# Patient Record
Sex: FEMALE | Race: ASIAN | NOT HISPANIC OR LATINO
[De-identification: names, ages, dates, MRNs, and addresses within clinical notes are randomized per-mention and may not be internally consistent; named-entity substitution may affect disease eponyms.]

---

## 2018-03-15 ENCOUNTER — APPOINTMENT (OUTPATIENT)
Dept: SURGERY | Facility: CLINIC | Age: 53
End: 2018-03-15
Payer: COMMERCIAL

## 2018-03-15 VITALS
HEIGHT: 63 IN | BODY MASS INDEX: 23.04 KG/M2 | HEART RATE: 76 BPM | DIASTOLIC BLOOD PRESSURE: 70 MMHG | SYSTOLIC BLOOD PRESSURE: 117 MMHG | WEIGHT: 130 LBS | TEMPERATURE: 98 F

## 2018-03-15 DIAGNOSIS — R22.32 LOCALIZED SWELLING, MASS AND LUMP, LEFT UPPER LIMB: ICD-10-CM

## 2018-03-15 DIAGNOSIS — Z82.3 FAMILY HISTORY OF STROKE: ICD-10-CM

## 2018-03-15 DIAGNOSIS — Z86.39 PERSONAL HISTORY OF OTHER ENDOCRINE, NUTRITIONAL AND METABOLIC DISEASE: ICD-10-CM

## 2018-03-15 DIAGNOSIS — Z83.3 FAMILY HISTORY OF DIABETES MELLITUS: ICD-10-CM

## 2018-03-15 LAB
BASOPHILS # BLD AUTO: 0.05 K/UL
BASOPHILS NFR BLD AUTO: 0.9 %
EOSINOPHIL # BLD AUTO: 0.2 K/UL
EOSINOPHIL NFR BLD AUTO: 3.7 %
HCT VFR BLD CALC: 40.9 %
HGB BLD-MCNC: 13.9 G/DL
IMM GRANULOCYTES NFR BLD AUTO: 0.4 %
LYMPHOCYTES # BLD AUTO: 1.63 K/UL
LYMPHOCYTES NFR BLD AUTO: 30.1 %
MAN DIFF?: NORMAL
MCHC RBC-ENTMCNC: 31.8 PG
MCHC RBC-ENTMCNC: 34 GM/DL
MCV RBC AUTO: 93.6 FL
MONOCYTES # BLD AUTO: 0.21 K/UL
MONOCYTES NFR BLD AUTO: 3.9 %
NEUTROPHILS # BLD AUTO: 3.31 K/UL
NEUTROPHILS NFR BLD AUTO: 61 %
PLATELET # BLD AUTO: 237 K/UL
RBC # BLD: 4.37 M/UL
RBC # FLD: 13.3 %
WBC # FLD AUTO: 5.42 K/UL

## 2018-03-15 PROCEDURE — 99202 OFFICE O/P NEW SF 15 MIN: CPT

## 2018-03-15 RX ORDER — AMOXICILLIN 500 MG/1
500 TABLET, FILM COATED ORAL
Qty: 56 | Refills: 0 | Status: ACTIVE | COMMUNITY
Start: 2017-12-07

## 2018-03-15 RX ORDER — CLARITHROMYCIN 500 MG/1
500 TABLET, FILM COATED ORAL
Qty: 28 | Refills: 0 | Status: ACTIVE | COMMUNITY
Start: 2017-12-07

## 2018-03-15 RX ORDER — OMEPRAZOLE 20 MG/1
20 CAPSULE, DELAYED RELEASE ORAL
Qty: 28 | Refills: 0 | Status: ACTIVE | COMMUNITY
Start: 2017-12-07

## 2018-03-16 LAB
ALBUMIN SERPL ELPH-MCNC: 4.2 G/DL
ALP BLD-CCNC: 60 U/L
ALT SERPL-CCNC: 11 U/L
ANION GAP SERPL CALC-SCNC: 12 MMOL/L
AST SERPL-CCNC: 15 U/L
BILIRUB SERPL-MCNC: 0.4 MG/DL
BUN SERPL-MCNC: 13 MG/DL
CALCIUM SERPL-MCNC: 9.7 MG/DL
CHLORIDE SERPL-SCNC: 100 MMOL/L
CO2 SERPL-SCNC: 28 MMOL/L
CREAT SERPL-MCNC: 0.83 MG/DL
GLUCOSE SERPL-MCNC: 139 MG/DL
HCG SERPL-MCNC: 3 MIU/ML
POTASSIUM SERPL-SCNC: 4 MMOL/L
PROT SERPL-MCNC: 7.3 G/DL
SODIUM SERPL-SCNC: 140 MMOL/L

## 2018-03-22 RX ORDER — SODIUM CHLORIDE 9 MG/ML
3 INJECTION INTRAMUSCULAR; INTRAVENOUS; SUBCUTANEOUS EVERY 8 HOURS
Qty: 0 | Refills: 0 | Status: DISCONTINUED | OUTPATIENT
Start: 2018-03-23 | End: 2018-03-31

## 2018-03-23 ENCOUNTER — OUTPATIENT (OUTPATIENT)
Dept: OUTPATIENT SERVICES | Facility: HOSPITAL | Age: 53
LOS: 1 days | End: 2018-03-23
Payer: COMMERCIAL

## 2018-03-23 ENCOUNTER — RESULT REVIEW (OUTPATIENT)
Age: 53
End: 2018-03-23

## 2018-03-23 ENCOUNTER — TRANSCRIPTION ENCOUNTER (OUTPATIENT)
Age: 53
End: 2018-03-23

## 2018-03-23 VITALS
WEIGHT: 130.95 LBS | SYSTOLIC BLOOD PRESSURE: 152 MMHG | TEMPERATURE: 98 F | HEART RATE: 81 BPM | DIASTOLIC BLOOD PRESSURE: 86 MMHG | RESPIRATION RATE: 18 BRPM | OXYGEN SATURATION: 99 % | HEIGHT: 62 IN

## 2018-03-23 VITALS
OXYGEN SATURATION: 100 % | TEMPERATURE: 98 F | HEART RATE: 65 BPM | SYSTOLIC BLOOD PRESSURE: 152 MMHG | DIASTOLIC BLOOD PRESSURE: 73 MMHG | RESPIRATION RATE: 14 BRPM

## 2018-03-23 DIAGNOSIS — R22.32 LOCALIZED SWELLING, MASS AND LUMP, LEFT UPPER LIMB: ICD-10-CM

## 2018-03-23 LAB — HCG UR QL: NEGATIVE — SIGNIFICANT CHANGE UP

## 2018-03-23 PROCEDURE — 88304 TISSUE EXAM BY PATHOLOGIST: CPT

## 2018-03-23 PROCEDURE — 24076 EX ARM/ELBOW TUM DEEP < 5 CM: CPT | Mod: LT

## 2018-03-23 PROCEDURE — 81025 URINE PREGNANCY TEST: CPT

## 2018-03-23 PROCEDURE — 88304 TISSUE EXAM BY PATHOLOGIST: CPT | Mod: 26

## 2018-03-23 PROCEDURE — 93005 ELECTROCARDIOGRAM TRACING: CPT

## 2018-03-23 RX ORDER — IBUPROFEN 200 MG
600 TABLET ORAL ONCE
Qty: 0 | Refills: 0 | Status: DISCONTINUED | OUTPATIENT
Start: 2018-03-23 | End: 2018-03-31

## 2018-03-23 RX ORDER — IBUPROFEN 200 MG
1 TABLET ORAL
Qty: 12 | Refills: 0 | OUTPATIENT
Start: 2018-03-23 | End: 2018-03-25

## 2018-03-23 RX ORDER — ONDANSETRON 8 MG/1
4 TABLET, FILM COATED ORAL ONCE
Qty: 0 | Refills: 0 | Status: DISCONTINUED | OUTPATIENT
Start: 2018-03-23 | End: 2018-03-23

## 2018-03-23 NOTE — ASU DISCHARGE PLAN (ADULT/PEDIATRIC). - MEDICATION SUMMARY - MEDICATIONS TO TAKE
I will START or STAY ON the medications listed below when I get home from the hospital:    ibuprofen 600 mg oral tablet  -- 1 tab(s) by mouth every 6 hours, As Needed -pain   -- Indication: For Mass of upper extremity, left

## 2018-03-23 NOTE — H&P PST ADULT - ASSESSMENT
52 F w/ a LUE subcutaneous mass, for elective excision today. Likely lipoma vs cystic mass vs pilomatrixoma

## 2018-03-23 NOTE — H&P PST ADULT - EXTREMITIES COMMENTS
LUE: Small 1.5x1.5cm mass at the volar aspect of the proximal forearm. Nontender, freely mobile, well-circumscribed. No overlying skin changes.   FROM throughout w/ 5/5 motor.  Sensation grossly intact.

## 2018-03-23 NOTE — H&P PST ADULT - HISTORY OF PRESENT ILLNESS
52 F w/ a left forearm mass x10 years that has been slowing enlarging. Presenting today for elective excision. No acute complaints at this time. Denies LUE neurovascular symptoms.     PMH: DAMION  PSH: C section  ALL: NKDA

## 2018-03-28 LAB — SURGICAL PATHOLOGY FINAL REPORT - CH: SIGNIFICANT CHANGE UP

## 2018-04-10 ENCOUNTER — APPOINTMENT (OUTPATIENT)
Dept: SURGERY | Facility: CLINIC | Age: 53
End: 2018-04-10
Payer: COMMERCIAL

## 2018-04-10 PROCEDURE — 99024 POSTOP FOLLOW-UP VISIT: CPT

## 2019-08-31 NOTE — ASU PREOP CHECKLIST - HEIGHT IN CM
You can access the FollowMyHealth Patient Portal offered by [facility] by registering at the following website: [email or web address]. By joining [facility] FollowMyHealth portal, you will also be able to view your health information using other applications (apps) compatible with our system.
157.48

## 2020-01-08 ENCOUNTER — RESULT REVIEW (OUTPATIENT)
Age: 55
End: 2020-01-08

## 2022-09-16 NOTE — BRIEF OPERATIVE NOTE - ESTIMATED BLOOD LOSS
[facility] [address]    September 16, 2022    Yamini Miner  [address] [facility]  [address] [place] [id number]      Dear Yamini Miner,    This is in regards to your visit at the [facility] [address] on 9/14/22. You had some lab work done and we have attempted to contact you by phone to give you the results. We would also like to know how you are doing since your visit with us.      _x__  Your test results are positive. You were given the correct medication for your condition. Please finish the medication and follow up with your family doctor as needed.      Please call [facility] [address] at Dept: [phone] with any questions.    Sincerely,    SHANTHI HERNANDEZ DR  Dept: [phone]   1
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
## 2024-01-03 NOTE — ASU PATIENT PROFILE, ADULT - BILL OF RIGHTS/ADMISSION INFORMATION PROVIDED TO:
"Post-op (3 month recheck- 5 months s/p left Hardware removal 8/3/23-prior left Lisfranc ORIF 23)      Dawna Lovell is 5 months status post removal hardware left Lisfranc, initial ORIF 2023. .  She notes that physical therapy is helping but she still having a lot of pain in the foot.  She does have her orthotics.    Past Surgical History:   Procedure Laterality Date     SECTION  2003    COLONOSCOPY      FOOT SURGERY Left 2023    Hardware Removal Lisfranc ORIF 23    MOUTH SURGERY      ORIF FOOT FRACTURE Left 2023    Procedure: ORIF left lisfranc foot injury;  Surgeon: Milagro Parker MD;  Location: [facility];  Service: Orthopedics;  Laterality: Left;    WISDOM TOOTH EXTRACTION         Ht 172.7 cm (67.99\")   Wt 89.4 kg (197 lb 1.5 oz)   BMI 29.97 kg/m²         Good alignment, no erythema, no drainage, no sign of infection, normal post op swelling left foot, tender over the second and third TMT's    ordered and reviewed x-rays today    Assessment and Plan:   1. Surgery follow-up  She is still having some pain, she does have the orthotics.  PT is helping.  Continue PT.  I will see her in 6 months with standing 2 views of the foot.  She is someone who may ultimately need fusion.  It is still too early to make that determination.  - XR Foot 2 View Left    2.  No change in neuropathy, this may contribute to pain        Milagro Parker MD  " Patient